# Patient Record
Sex: FEMALE | Race: WHITE | NOT HISPANIC OR LATINO | Employment: OTHER | URBAN - METROPOLITAN AREA
[De-identification: names, ages, dates, MRNs, and addresses within clinical notes are randomized per-mention and may not be internally consistent; named-entity substitution may affect disease eponyms.]

---

## 2022-04-07 ENCOUNTER — HISTORICAL (OUTPATIENT)
Dept: ADMINISTRATIVE | Facility: HOSPITAL | Age: 72
End: 2022-04-07

## 2022-04-23 VITALS
HEIGHT: 62 IN | WEIGHT: 130.06 LBS | BODY MASS INDEX: 23.93 KG/M2 | SYSTOLIC BLOOD PRESSURE: 174 MMHG | DIASTOLIC BLOOD PRESSURE: 71 MMHG

## 2023-09-25 RX ORDER — PIOGLITAZONEHYDROCHLORIDE 15 MG/1
1 TABLET ORAL DAILY
COMMUNITY

## 2023-09-25 RX ORDER — ISOSORBIDE MONONITRATE 30 MG/1
1 TABLET, EXTENDED RELEASE ORAL DAILY
Status: ON HOLD | COMMUNITY
End: 2023-10-19 | Stop reason: HOSPADM

## 2023-09-25 NOTE — PROGRESS NOTES
"    [facility] - Clinic Note  Wilmer Zhong MD      Patient Name: Sheridan Fong                   : 1950      MRN: [id number]   Visit Date: 2023       History Present Illness     Reason for Visit: Carotid Artery Disease    Ms. Fong presents to the clinic for evaluation of severe carotid disease. CTA neck done 23. She denies signs or symptoms of stroke. She states her previous primary care provider told her years ago there was evidence of a small stroke on imaging. She has been under carotid surveillance with her cardiologist via ultrasound testing. She denies history of neck surgery or radiation. She reports heart surgery in  with stenting x 8 after that time. Most recent was in 2016. She states she could easily walk 0.25 mile on most days. Primary limitation is shortness of breath and/or dizziness. She is a former smoker.       REVIEW OF SYSTEMS:  ROS  12 point review of systems conducted, negative except as stated in the history of present illness. See HPI for details.        Physical Exam      Visit Vitals  BP (!) 178/81 (BP Location: Left arm)   Pulse 71   Ht 5' 3" (1.6 m)   Wt 64.4 kg (142 lb)   BMI 25.15 kg/m²         General: well-nourished, no acute distress, and healthy appearing, ambulating normally, alert, pleasant, conversant, and oriented  Neurologic: cranial nerves are grossly intact marginal mandibular of the facial nerve and hypoglossal nerve demonstrates appropriate function, no neurologic deficits  HEENT: grossly normal and no scleral icterus  Neck/Chest: normal , soft without lymphadenopathy, and palpable carotid pulses bilaterally, no visible neck scars  Respiratory: breathing easily and without respiratory distress  Abdomen: normal and soft  Cardiology: regular rate and rhythm    Upper Extremity Arterial Exam:   Right - radial is palpable  Left - radial is palpable    Lower Extremity Arterial Exam:  Right - posterior tibial is palpable and dorsalis pedis " is palpable  Left - posterior tibial is palpable and dorsalis pedis is palpable    Musculoskeletal:   Upper Extremity: normal bilateral hand function, hands are warm bilaterally  Lower Extremity: no edema present to bilateral lower extremities              Assessment and Plan     Ms. Fong is a 72 y.o. woman with asymptomatic carotid artery stenosis. I personally reviewed the images from her CTA neck obtained which demonstrates RIGHT carotid with severe focal stenosis (consistent with ultrasound velocities of 244 cm/s) and LEFT carotid demonstrates moderate stenosis. I recommend proceeding with a RIGHT CAROTID ENDARTERECTOMY for stroke risk reduction. We discussed the risks and benefits of surgery including but not limited to bleeding, stroke, cranial nerve injury, myocardial infarction, and/or infection. We also discussed the increased of stroke without surgical intervention. She wishes to proceed.    Advised initiation of low-dose aspirin around the time of surgery while off Plavix.       1. Bilateral carotid artery stenosis  - Basic Metabolic Panel; Future  - CBC Auto Differential; Future  - EKG 12-lead; Future  - X-Ray Chest PA And Lateral Pre-OP; Future           Imaging Obtained/Reviewed   Study: carotid duplex and CTA neck  Date:   8/20/23, 9/27/23        Medical History     Past Medical History:   Diagnosis Date    Anemia, unspecified     Arthritis     CAD (coronary artery disease)     Cardiac arrhythmia     Diverticulitis     Heart disease     HLD (hyperlipidemia)     HTN (hypertension)     Hypomagnesemia     Left ventricular dysfunction     LVH (left ventricular hypertrophy)     Nonrheumatic mitral (valve) insufficiency     Occlusion and stenosis of bilateral carotid arteries     Type 2 diabetes mellitus without complications      Past Surgical History:   Procedure Laterality Date    BIOPSY  02/14/2019    Vulvar    COLONOSCOPY      CORONARY ANGIOGRAPHY INCLUDING BYPASS GRAFTS WITH CATHETERIZATION OF LEFT  HEART Left 2014    CORONARY ARTERY BYPASS GRAFT  10/01/2000    x4    OOPHORECTOMY      ROBOT-ASSISTED SURGICAL REMOVAL OF FALLOPIAN TUBE USING DA DOUG XI      TUBAL LIGATION       Family History   Problem Relation Age of Onset    Hyperlipidemia Mother     Heart attack Father     Hypertension Sister     Hypertension Brother     Cancer Brother         Tumor of colon     Social History     Socioeconomic History    Marital status: Unknown   Tobacco Use    Smoking status: Former     Current packs/day: 0.00     Types: Cigarettes     Quit date:      Years since quittin.7    Smokeless tobacco: Never     Current Outpatient Medications   Medication Instructions    albuterol (PROVENTIL/VENTOLIN HFA) 90 mcg/actuation inhaler 1 puff as needed Inhalation every 4 hrs for 30 days    clopidogrel bisulfate (PLAVIX ORAL) No dose, route, or frequency recorded.    cyclobenzaprine (FLEXERIL) 10 MG tablet 1 tablet Orally every 8 hrs as needed for 5 days    fenofibrate (TRICOR) 145 MG tablet 1 tablet with food Orally Once a day for 90 days    isosorbide dinitrate (ISORDIL) 30 MG Tab 1 tablet Orally Twice a day for 90 days    isosorbide mononitrate (IMDUR) 30 MG 24 hr tablet 1 tablet, Oral, Daily    nitroGLYCERIN (NITROSTAT) 0.4 MG SL tablet Sublingual    pioglitazone (ACTOS) 15 MG tablet 1 tablet, Oral, Daily    simvastatin (ZOCOR) 20 MG tablet TAKE ONE TABLET BY MOUTH EVERY DAY Orally Once a day for 90 days    vitamin E 100 UNIT capsule APPLY A THIN LAYER TO THE AFFECTED AREA AS NEEDED     Review of patient's allergies indicates:   Allergen Reactions    Gemfibrozil Other (See Comments)    Iodine     Nitroglycerin Other (See Comments)       Patient Care Team:  Jayde Lao MD as PCP - General (Internal Medicine)  Markos Ortiz MD as Consulting Physician (Cardiology)        No follow-ups on file. In addition to their scheduled follow up, the patient has also been instructed to follow up on as needed basis.     No  future appointments.

## 2023-09-25 NOTE — H&P (VIEW-ONLY)
"    [facility] Vascular - Clinic Note  Wilemr Zhong MD      Patient Name: Sheridan Fong                   : 1950      MRN: [id number]   Visit Date: 2023       History Present Illness     Reason for Visit: Carotid Artery Disease    Ms. Fong presents to the clinic for evaluation of severe carotid disease. CTA neck done 23. She denies signs or symptoms of stroke. She states her previous primary care provider told her years ago there was evidence of a small stroke on imaging. She has been under carotid surveillance with her cardiologist via ultrasound testing. She denies history of neck surgery or radiation. She reports heart surgery in  with stenting x 8 after that time. Most recent was in 2016. She states she could easily walk 0.25 mile on most days. Primary limitation is shortness of breath and/or dizziness. She is a former smoker.       REVIEW OF SYSTEMS:  ROS  12 point review of systems conducted, negative except as stated in the history of present illness. See HPI for details.        Physical Exam      Visit Vitals  BP (!) 178/81 (BP Location: Left arm)   Pulse 71   Ht 5' 3" (1.6 m)   Wt 64.4 kg (142 lb)   BMI 25.15 kg/m²         General: well-nourished, no acute distress, and healthy appearing, ambulating normally, alert, pleasant, conversant, and oriented  Neurologic: cranial nerves are grossly intact marginal mandibular of the facial nerve and hypoglossal nerve demonstrates appropriate function, no neurologic deficits  HEENT: grossly normal and no scleral icterus  Neck/Chest: normal , soft without lymphadenopathy, and palpable carotid pulses bilaterally, no visible neck scars  Respiratory: breathing easily and without respiratory distress  Abdomen: normal and soft  Cardiology: regular rate and rhythm    Upper Extremity Arterial Exam:   Right - radial is palpable  Left - radial is palpable    Lower Extremity Arterial Exam:  Right - posterior tibial is palpable and dorsalis pedis " is palpable  Left - posterior tibial is palpable and dorsalis pedis is palpable    Musculoskeletal:   Upper Extremity: normal bilateral hand function, hands are warm bilaterally  Lower Extremity: no edema present to bilateral lower extremities              Assessment and Plan     Ms. Fong is a 72 y.o. woman with asymptomatic carotid artery stenosis. I personally reviewed the images from her CTA neck obtained which demonstrates RIGHT carotid with severe focal stenosis (consistent with ultrasound velocities of 244 cm/s) and LEFT carotid demonstrates moderate stenosis. I recommend proceeding with a RIGHT CAROTID ENDARTERECTOMY for stroke risk reduction. We discussed the risks and benefits of surgery including but not limited to bleeding, stroke, cranial nerve injury, myocardial infarction, and/or infection. We also discussed the increased of stroke without surgical intervention. She wishes to proceed.    Advised initiation of low-dose aspirin around the time of surgery while off Plavix.       1. Bilateral carotid artery stenosis  - Basic Metabolic Panel; Future  - CBC Auto Differential; Future  - EKG 12-lead; Future  - X-Ray Chest PA And Lateral Pre-OP; Future           Imaging Obtained/Reviewed   Study: carotid duplex and CTA neck  Date:   8/20/23, 9/27/23        Medical History     Past Medical History:   Diagnosis Date    Anemia, unspecified     Arthritis     CAD (coronary artery disease)     Cardiac arrhythmia     Diverticulitis     Heart disease     HLD (hyperlipidemia)     HTN (hypertension)     Hypomagnesemia     Left ventricular dysfunction     LVH (left ventricular hypertrophy)     Nonrheumatic mitral (valve) insufficiency     Occlusion and stenosis of bilateral carotid arteries     Type 2 diabetes mellitus without complications      Past Surgical History:   Procedure Laterality Date    BIOPSY  02/14/2019    Vulvar    COLONOSCOPY      CORONARY ANGIOGRAPHY INCLUDING BYPASS GRAFTS WITH CATHETERIZATION OF LEFT  HEART Left 2014    CORONARY ARTERY BYPASS GRAFT  10/01/2000    x4    OOPHORECTOMY      ROBOT-ASSISTED SURGICAL REMOVAL OF FALLOPIAN TUBE USING DA DOUG XI      TUBAL LIGATION       Family History   Problem Relation Age of Onset    Hyperlipidemia Mother     Heart attack Father     Hypertension Sister     Hypertension Brother     Cancer Brother         Tumor of colon     Social History     Socioeconomic History    Marital status: Unknown   Tobacco Use    Smoking status: Former     Current packs/day: 0.00     Types: Cigarettes     Quit date:      Years since quittin.7    Smokeless tobacco: Never     Current Outpatient Medications   Medication Instructions    albuterol (PROVENTIL/VENTOLIN HFA) 90 mcg/actuation inhaler 1 puff as needed Inhalation every 4 hrs for 30 days    clopidogrel bisulfate (PLAVIX ORAL) No dose, route, or frequency recorded.    cyclobenzaprine (FLEXERIL) 10 MG tablet 1 tablet Orally every 8 hrs as needed for 5 days    fenofibrate (TRICOR) 145 MG tablet 1 tablet with food Orally Once a day for 90 days    isosorbide dinitrate (ISORDIL) 30 MG Tab 1 tablet Orally Twice a day for 90 days    isosorbide mononitrate (IMDUR) 30 MG 24 hr tablet 1 tablet, Oral, Daily    nitroGLYCERIN (NITROSTAT) 0.4 MG SL tablet Sublingual    pioglitazone (ACTOS) 15 MG tablet 1 tablet, Oral, Daily    simvastatin (ZOCOR) 20 MG tablet TAKE ONE TABLET BY MOUTH EVERY DAY Orally Once a day for 90 days    vitamin E 100 UNIT capsule APPLY A THIN LAYER TO THE AFFECTED AREA AS NEEDED     Review of patient's allergies indicates:   Allergen Reactions    Gemfibrozil Other (See Comments)    Iodine     Nitroglycerin Other (See Comments)       Patient Care Team:  Jayde Lao MD as PCP - General (Internal Medicine)  Markos Ortiz MD as Consulting Physician (Cardiology)        No follow-ups on file. In addition to their scheduled follow up, the patient has also been instructed to follow up on as needed basis.     No  future appointments.

## 2023-09-27 DIAGNOSIS — I65.23 BILATERAL CAROTID ARTERY STENOSIS: Primary | ICD-10-CM

## 2023-09-27 RX ORDER — NITROGLYCERIN 0.4 MG/1
TABLET SUBLINGUAL
COMMUNITY

## 2023-09-27 RX ORDER — ISOSORBIDE DINITRATE 30 MG/1
40 TABLET ORAL 3 TIMES DAILY
Status: ON HOLD | COMMUNITY
End: 2023-10-19 | Stop reason: CLARIF

## 2023-09-27 RX ORDER — FENOFIBRATE 145 MG/1
TABLET, FILM COATED ORAL
COMMUNITY

## 2023-09-27 RX ORDER — SIMVASTATIN 20 MG/1
TABLET, FILM COATED ORAL
COMMUNITY

## 2023-09-27 RX ORDER — CYCLOBENZAPRINE HCL 10 MG
TABLET ORAL
Status: ON HOLD | COMMUNITY
Start: 2023-01-16 | End: 2023-10-19 | Stop reason: HOSPADM

## 2023-09-27 RX ORDER — ALBUTEROL SULFATE 90 UG/1
AEROSOL, METERED RESPIRATORY (INHALATION)
COMMUNITY
Start: 2023-01-16

## 2023-09-28 ENCOUNTER — OFFICE VISIT (OUTPATIENT)
Dept: VASCULAR SURGERY | Facility: CLINIC | Age: 73
End: 2023-09-28
Payer: MEDICARE

## 2023-09-28 VITALS
SYSTOLIC BLOOD PRESSURE: 178 MMHG | DIASTOLIC BLOOD PRESSURE: 81 MMHG | HEIGHT: 63 IN | BODY MASS INDEX: 25.16 KG/M2 | HEART RATE: 71 BPM | WEIGHT: 142 LBS

## 2023-09-28 DIAGNOSIS — I65.23 BILATERAL CAROTID ARTERY STENOSIS: Primary | ICD-10-CM

## 2023-09-28 PROCEDURE — 99204 PR OFFICE/OUTPT VISIT, NEW, LEVL IV, 45-59 MIN: ICD-10-PCS | Mod: ,,, | Performed by: SPECIALIST

## 2023-09-28 PROCEDURE — 99204 OFFICE O/P NEW MOD 45 MIN: CPT | Mod: ,,, | Performed by: SPECIALIST

## 2023-09-28 RX ORDER — HYDROCODONE BITARTRATE AND ACETAMINOPHEN 7.5; 325 MG/1; MG/1
1 TABLET ORAL EVERY 6 HOURS PRN
Qty: 28 TABLET | Refills: 0 | Status: ON HOLD | OUTPATIENT
Start: 2023-09-28 | End: 2023-10-19 | Stop reason: SDUPTHER

## 2023-09-28 RX ORDER — SODIUM CHLORIDE 9 MG/ML
INJECTION, SOLUTION INTRAVENOUS CONTINUOUS
Status: CANCELLED | OUTPATIENT
Start: 2023-09-28

## 2023-09-28 NOTE — LETTER
[facility]           Medication Clearance Form    PLEASE PROVIDE ALL INFORMATION      Date : 2023       Please provide us with WRITTEN Medication Clearance:  Ms. Sheridan Fong    : 1950       Please send most recent clinic, EKG, echocardiogram, and/or additional cardiac testing.      She will be scheduled for a Right Carotid Endarterectomy under General scheduled on  10/18/2023.      PLEASE PROVIDE CLEARANCE TO HOLD Plavix (clopidogrel) X 5 DAYS.       Thank you for your help in this matter.    Sincerely,        Wilmer Zhong MD  [facility]  Phone: [phone]  Fax: [phone]

## 2023-10-09 RX ORDER — CLOPIDOGREL BISULFATE 75 MG/1
75 TABLET ORAL DAILY
COMMUNITY

## 2023-10-11 ENCOUNTER — ANESTHESIA EVENT (OUTPATIENT)
Dept: SURGERY | Facility: HOSPITAL | Age: 73
DRG: 039 | End: 2023-10-11
Payer: MEDICARE

## 2023-10-11 ENCOUNTER — HOSPITAL ENCOUNTER (OUTPATIENT)
Dept: RADIOLOGY | Facility: HOSPITAL | Age: 73
Discharge: HOME | End: 2023-10-11
Attending: SPECIALIST
Payer: MEDICARE

## 2023-10-11 DIAGNOSIS — I65.23 BILATERAL CAROTID ARTERY STENOSIS: ICD-10-CM

## 2023-10-11 PROCEDURE — 71046 X-RAY EXAM CHEST 2 VIEWS: CPT | Mod: TC

## 2023-10-18 ENCOUNTER — HOSPITAL ENCOUNTER (INPATIENT)
Facility: HOSPITAL | Age: 73
LOS: 1 days | Discharge: HOME | DRG: 039 | End: 2023-10-19
Attending: SPECIALIST | Admitting: SPECIALIST
Payer: MEDICARE

## 2023-10-18 ENCOUNTER — ANESTHESIA (OUTPATIENT)
Dept: SURGERY | Facility: HOSPITAL | Age: 73
DRG: 039 | End: 2023-10-18
Payer: MEDICARE

## 2023-10-18 DIAGNOSIS — I65.23 BILATERAL CAROTID ARTERY STENOSIS: ICD-10-CM

## 2023-10-18 LAB
ABORH RETYPE: NORMAL
GROUP & RH: NORMAL
INDIRECT COOMBS GEL: NORMAL
POCT GLUCOSE: 102 MG/DL (ref 70–110)
POCT GLUCOSE: 132 MG/DL (ref 70–110)
POCT GLUCOSE: 142 MG/DL (ref 70–110)
SPECIMEN OUTDATE: NORMAL

## 2023-10-18 PROCEDURE — 27201423 OPTIME MED/SURG SUP & DEVICES STERILE SUPPLY: Performed by: SPECIALIST

## 2023-10-18 PROCEDURE — 35301 PR THROMBOENDARTECTMY NECK,NECK INCIS: ICD-10-PCS | Mod: RT,,, | Performed by: SPECIALIST

## 2023-10-18 PROCEDURE — 11000001 HC ACUTE MED/SURG PRIVATE ROOM

## 2023-10-18 PROCEDURE — 37000008 HC ANESTHESIA 1ST 15 MINUTES: Performed by: SPECIALIST

## 2023-10-18 PROCEDURE — 63600175 PHARM REV CODE 636 W HCPCS

## 2023-10-18 PROCEDURE — 71000039 HC RECOVERY, EACH ADD'L HOUR: Performed by: SPECIALIST

## 2023-10-18 PROCEDURE — 86901 BLOOD TYPING SEROLOGIC RH(D): CPT | Performed by: ANESTHESIOLOGY

## 2023-10-18 PROCEDURE — 25000003 PHARM REV CODE 250: Performed by: SPECIALIST

## 2023-10-18 PROCEDURE — 35301 RECHANNELING OF ARTERY: CPT | Mod: RT,,, | Performed by: SPECIALIST

## 2023-10-18 PROCEDURE — 25000003 PHARM REV CODE 250

## 2023-10-18 PROCEDURE — 71000033 HC RECOVERY, INTIAL HOUR: Performed by: SPECIALIST

## 2023-10-18 PROCEDURE — C1768 GRAFT, VASCULAR: HCPCS | Performed by: SPECIALIST

## 2023-10-18 PROCEDURE — 36620 INSERTION CATHETER ARTERY: CPT

## 2023-10-18 PROCEDURE — D9220A PRA ANESTHESIA: Mod: ANES,,, | Performed by: ANESTHESIOLOGY

## 2023-10-18 PROCEDURE — D9220A PRA ANESTHESIA: ICD-10-PCS | Mod: ANES,,, | Performed by: ANESTHESIOLOGY

## 2023-10-18 PROCEDURE — 71000015 HC POSTOP RECOV 1ST HR: Performed by: SPECIALIST

## 2023-10-18 PROCEDURE — 63600175 PHARM REV CODE 636 W HCPCS: Performed by: ANESTHESIOLOGY

## 2023-10-18 PROCEDURE — 37000009 HC ANESTHESIA EA ADD 15 MINS: Performed by: SPECIALIST

## 2023-10-18 PROCEDURE — D9220A PRA ANESTHESIA: ICD-10-PCS | Mod: CRNA,,, | Performed by: NURSE ANESTHETIST, CERTIFIED REGISTERED

## 2023-10-18 PROCEDURE — 63600175 PHARM REV CODE 636 W HCPCS: Performed by: SPECIALIST

## 2023-10-18 PROCEDURE — 27000221 HC OXYGEN, UP TO 24 HOURS

## 2023-10-18 PROCEDURE — 36620 INSERTION CATHETER ARTERY: CPT | Mod: 59,,, | Performed by: ANESTHESIOLOGY

## 2023-10-18 PROCEDURE — 36000707: Performed by: SPECIALIST

## 2023-10-18 PROCEDURE — 88311 DECALCIFY TISSUE: CPT

## 2023-10-18 PROCEDURE — 88304 TISSUE EXAM BY PATHOLOGIST: CPT | Performed by: SPECIALIST

## 2023-10-18 PROCEDURE — 36000706: Performed by: SPECIALIST

## 2023-10-18 PROCEDURE — 82962 GLUCOSE BLOOD TEST: CPT | Performed by: SPECIALIST

## 2023-10-18 PROCEDURE — 36620 ARTERIAL: ICD-10-PCS | Mod: 59,,, | Performed by: ANESTHESIOLOGY

## 2023-10-18 PROCEDURE — D9220A PRA ANESTHESIA: Mod: CRNA,,, | Performed by: NURSE ANESTHETIST, CERTIFIED REGISTERED

## 2023-10-18 DEVICE — PATCH XENOSURE TAPR .8X8CM: Type: IMPLANTABLE DEVICE | Site: CAROTID | Status: FUNCTIONAL

## 2023-10-18 RX ORDER — HYDROCODONE BITARTRATE AND ACETAMINOPHEN 7.5; 325 MG/1; MG/1
1 TABLET ORAL EVERY 6 HOURS PRN
Status: DISCONTINUED | OUTPATIENT
Start: 2023-10-18 | End: 2023-10-19 | Stop reason: HOSPADM

## 2023-10-18 RX ORDER — IBUPROFEN 200 MG
16 TABLET ORAL
Status: DISCONTINUED | OUTPATIENT
Start: 2023-10-18 | End: 2023-10-19 | Stop reason: HOSPADM

## 2023-10-18 RX ORDER — HEPARIN SODIUM 5000 [USP'U]/ML
INJECTION, SOLUTION INTRAVENOUS; SUBCUTANEOUS
Status: DISCONTINUED | OUTPATIENT
Start: 2023-10-18 | End: 2023-10-18 | Stop reason: HOSPADM

## 2023-10-18 RX ORDER — ACETAMINOPHEN 10 MG/ML
INJECTION, SOLUTION INTRAVENOUS
Status: DISCONTINUED | OUTPATIENT
Start: 2023-10-18 | End: 2023-10-18

## 2023-10-18 RX ORDER — SODIUM CHLORIDE, SODIUM LACTATE, POTASSIUM CHLORIDE, CALCIUM CHLORIDE 600; 310; 30; 20 MG/100ML; MG/100ML; MG/100ML; MG/100ML
INJECTION, SOLUTION INTRAVENOUS CONTINUOUS
Status: CANCELLED | OUTPATIENT
Start: 2023-10-18

## 2023-10-18 RX ORDER — ONDANSETRON 4 MG/1
4 TABLET, ORALLY DISINTEGRATING ORAL EVERY 6 HOURS PRN
Status: CANCELLED | OUTPATIENT
Start: 2023-10-18

## 2023-10-18 RX ORDER — NITROGLYCERIN 0.4 MG/1
0.4 TABLET SUBLINGUAL EVERY 5 MIN PRN
Status: DISCONTINUED | OUTPATIENT
Start: 2023-10-18 | End: 2023-10-19 | Stop reason: HOSPADM

## 2023-10-18 RX ORDER — HYDRALAZINE HYDROCHLORIDE 20 MG/ML
10 INJECTION INTRAMUSCULAR; INTRAVENOUS
Status: DISCONTINUED | OUTPATIENT
Start: 2023-10-18 | End: 2023-10-19 | Stop reason: HOSPADM

## 2023-10-18 RX ORDER — ASPIRIN 81 MG/1
81 TABLET ORAL DAILY
COMMUNITY

## 2023-10-18 RX ORDER — CEFAZOLIN SODIUM 2 G/50ML
2 SOLUTION INTRAVENOUS
Status: COMPLETED | OUTPATIENT
Start: 2023-10-18 | End: 2023-10-18

## 2023-10-18 RX ORDER — ONDANSETRON 2 MG/ML
4 INJECTION INTRAMUSCULAR; INTRAVENOUS EVERY 12 HOURS PRN
Status: DISCONTINUED | OUTPATIENT
Start: 2023-10-18 | End: 2023-10-19 | Stop reason: HOSPADM

## 2023-10-18 RX ORDER — MUPIROCIN 20 MG/G
OINTMENT TOPICAL 2 TIMES DAILY
Status: DISCONTINUED | OUTPATIENT
Start: 2023-10-18 | End: 2023-10-19 | Stop reason: HOSPADM

## 2023-10-18 RX ORDER — LIDOCAINE HYDROCHLORIDE 20 MG/ML
INJECTION INTRAVENOUS
Status: DISCONTINUED | OUTPATIENT
Start: 2023-10-18 | End: 2023-10-18

## 2023-10-18 RX ORDER — HEPARIN SODIUM 1000 [USP'U]/ML
INJECTION, SOLUTION INTRAVENOUS; SUBCUTANEOUS
Status: DISCONTINUED | OUTPATIENT
Start: 2023-10-18 | End: 2023-10-18

## 2023-10-18 RX ORDER — INSULIN ASPART 100 [IU]/ML
0-10 INJECTION, SOLUTION INTRAVENOUS; SUBCUTANEOUS
Status: DISCONTINUED | OUTPATIENT
Start: 2023-10-18 | End: 2023-10-19 | Stop reason: HOSPADM

## 2023-10-18 RX ORDER — HYDROMORPHONE HYDROCHLORIDE 2 MG/ML
0.5 INJECTION, SOLUTION INTRAMUSCULAR; INTRAVENOUS; SUBCUTANEOUS EVERY 4 HOURS PRN
Status: DISCONTINUED | OUTPATIENT
Start: 2023-10-18 | End: 2023-10-19 | Stop reason: HOSPADM

## 2023-10-18 RX ORDER — ALBUTEROL SULFATE 90 UG/1
1 AEROSOL, METERED RESPIRATORY (INHALATION) EVERY 4 HOURS PRN
Status: DISCONTINUED | OUTPATIENT
Start: 2023-10-18 | End: 2023-10-19 | Stop reason: HOSPADM

## 2023-10-18 RX ORDER — ONDANSETRON 2 MG/ML
INJECTION INTRAMUSCULAR; INTRAVENOUS
Status: DISCONTINUED | OUTPATIENT
Start: 2023-10-18 | End: 2023-10-18

## 2023-10-18 RX ORDER — DEXMEDETOMIDINE HYDROCHLORIDE 100 UG/ML
INJECTION, SOLUTION INTRAVENOUS
Status: DISCONTINUED | OUTPATIENT
Start: 2023-10-18 | End: 2023-10-18

## 2023-10-18 RX ORDER — SODIUM CITRATE AND CITRIC ACID MONOHYDRATE 334; 500 MG/5ML; MG/5ML
30 SOLUTION ORAL
Status: CANCELLED | OUTPATIENT
Start: 2023-10-18

## 2023-10-18 RX ORDER — GLUCAGON 1 MG
1 KIT INJECTION
Status: DISCONTINUED | OUTPATIENT
Start: 2023-10-18 | End: 2023-10-19 | Stop reason: HOSPADM

## 2023-10-18 RX ORDER — LIDOCAINE HYDROCHLORIDE 10 MG/ML
1 INJECTION, SOLUTION EPIDURAL; INFILTRATION; INTRACAUDAL; PERINEURAL ONCE
Status: CANCELLED | OUTPATIENT
Start: 2023-10-18 | End: 2023-10-18

## 2023-10-18 RX ORDER — ATORVASTATIN CALCIUM 10 MG/1
20 TABLET, FILM COATED ORAL DAILY
Status: DISCONTINUED | OUTPATIENT
Start: 2023-10-18 | End: 2023-10-19 | Stop reason: HOSPADM

## 2023-10-18 RX ORDER — MIDAZOLAM HYDROCHLORIDE 1 MG/ML
2 INJECTION INTRAMUSCULAR; INTRAVENOUS ONCE AS NEEDED
Status: CANCELLED | OUTPATIENT
Start: 2023-10-18 | End: 2035-03-15

## 2023-10-18 RX ORDER — IBUPROFEN 200 MG
24 TABLET ORAL
Status: DISCONTINUED | OUTPATIENT
Start: 2023-10-18 | End: 2023-10-19 | Stop reason: HOSPADM

## 2023-10-18 RX ORDER — CEFAZOLIN SODIUM 1 G/3ML
INJECTION, POWDER, FOR SOLUTION INTRAMUSCULAR; INTRAVENOUS
Status: DISCONTINUED | OUTPATIENT
Start: 2023-10-18 | End: 2023-10-18 | Stop reason: HOSPADM

## 2023-10-18 RX ORDER — ROCURONIUM BROMIDE 10 MG/ML
INJECTION, SOLUTION INTRAVENOUS
Status: DISCONTINUED | OUTPATIENT
Start: 2023-10-18 | End: 2023-10-18

## 2023-10-18 RX ORDER — PROTAMINE SULFATE 10 MG/ML
INJECTION, SOLUTION INTRAVENOUS
Status: DISCONTINUED | OUTPATIENT
Start: 2023-10-18 | End: 2023-10-18

## 2023-10-18 RX ORDER — ACETAMINOPHEN 10 MG/ML
1000 INJECTION, SOLUTION INTRAVENOUS ONCE
Status: DISCONTINUED | OUTPATIENT
Start: 2023-10-18 | End: 2023-10-18 | Stop reason: HOSPADM

## 2023-10-18 RX ORDER — ISOSORBIDE DINITRATE 20 MG/1
40 TABLET ORAL 3 TIMES DAILY
Status: DISCONTINUED | OUTPATIENT
Start: 2023-10-18 | End: 2023-10-19 | Stop reason: HOSPADM

## 2023-10-18 RX ORDER — SODIUM CHLORIDE 9 MG/ML
INJECTION, SOLUTION INTRAVENOUS CONTINUOUS
Status: DISCONTINUED | OUTPATIENT
Start: 2023-10-18 | End: 2023-10-18

## 2023-10-18 RX ORDER — FENTANYL CITRATE 50 UG/ML
INJECTION, SOLUTION INTRAMUSCULAR; INTRAVENOUS
Status: DISCONTINUED | OUTPATIENT
Start: 2023-10-18 | End: 2023-10-18

## 2023-10-18 RX ORDER — GLYCOPYRROLATE 0.2 MG/ML
INJECTION INTRAMUSCULAR; INTRAVENOUS
Status: DISCONTINUED | OUTPATIENT
Start: 2023-10-18 | End: 2023-10-18

## 2023-10-18 RX ORDER — MEPERIDINE HYDROCHLORIDE 25 MG/ML
12.5 INJECTION INTRAMUSCULAR; INTRAVENOUS; SUBCUTANEOUS ONCE AS NEEDED
Status: DISCONTINUED | OUTPATIENT
Start: 2023-10-18 | End: 2023-10-18 | Stop reason: HOSPADM

## 2023-10-18 RX ORDER — HYDRALAZINE HYDROCHLORIDE 20 MG/ML
5 INJECTION INTRAMUSCULAR; INTRAVENOUS
Status: DISCONTINUED | OUTPATIENT
Start: 2023-10-18 | End: 2023-10-18 | Stop reason: HOSPADM

## 2023-10-18 RX ORDER — HYDROMORPHONE HYDROCHLORIDE 2 MG/ML
0.2 INJECTION, SOLUTION INTRAMUSCULAR; INTRAVENOUS; SUBCUTANEOUS EVERY 5 MIN PRN
Status: DISCONTINUED | OUTPATIENT
Start: 2023-10-18 | End: 2023-10-18 | Stop reason: HOSPADM

## 2023-10-18 RX ORDER — ASPIRIN 325 MG
325 TABLET, DELAYED RELEASE (ENTERIC COATED) ORAL DAILY
Status: DISCONTINUED | OUTPATIENT
Start: 2023-10-18 | End: 2023-10-19 | Stop reason: HOSPADM

## 2023-10-18 RX ORDER — DEXAMETHASONE SODIUM PHOSPHATE 4 MG/ML
INJECTION, SOLUTION INTRA-ARTICULAR; INTRALESIONAL; INTRAMUSCULAR; INTRAVENOUS; SOFT TISSUE
Status: DISCONTINUED | OUTPATIENT
Start: 2023-10-18 | End: 2023-10-18

## 2023-10-18 RX ORDER — PHENYLEPHRINE HCL IN 0.9% NACL 1 MG/10 ML
SYRINGE (ML) INTRAVENOUS
Status: DISCONTINUED | OUTPATIENT
Start: 2023-10-18 | End: 2023-10-18

## 2023-10-18 RX ORDER — PROPOFOL 10 MG/ML
VIAL (ML) INTRAVENOUS
Status: DISCONTINUED | OUTPATIENT
Start: 2023-10-18 | End: 2023-10-18

## 2023-10-18 RX ADMIN — PROPOFOL 150 MG: 10 INJECTION, EMULSION INTRAVENOUS at 07:10

## 2023-10-18 RX ADMIN — HYDROMORPHONE HYDROCHLORIDE 0.2 MG: 2 INJECTION INTRAMUSCULAR; INTRAVENOUS; SUBCUTANEOUS at 11:10

## 2023-10-18 RX ADMIN — MUPIROCIN: 20 OINTMENT TOPICAL at 09:10

## 2023-10-18 RX ADMIN — FENTANYL CITRATE 100 MCG: 50 INJECTION, SOLUTION INTRAMUSCULAR; INTRAVENOUS at 07:10

## 2023-10-18 RX ADMIN — HYDROCODONE BITARTRATE AND ACETAMINOPHEN 1 TABLET: 7.5; 325 TABLET ORAL at 10:10

## 2023-10-18 RX ADMIN — PROTAMINE SULFATE 10 MG: 10 INJECTION, SOLUTION INTRAVENOUS at 09:10

## 2023-10-18 RX ADMIN — SODIUM CHLORIDE, SODIUM GLUCONATE, SODIUM ACETATE, POTASSIUM CHLORIDE AND MAGNESIUM CHLORIDE: 526; 502; 368; 37; 30 INJECTION, SOLUTION INTRAVENOUS at 07:10

## 2023-10-18 RX ADMIN — HEPARIN SODIUM 7000 UNITS: 1000 INJECTION, SOLUTION INTRAVENOUS; SUBCUTANEOUS at 08:10

## 2023-10-18 RX ADMIN — ISOSORBIDE DINITRATE 40 MG: 20 TABLET ORAL at 05:10

## 2023-10-18 RX ADMIN — ONDANSETRON 4 MG: 2 INJECTION INTRAMUSCULAR; INTRAVENOUS at 03:10

## 2023-10-18 RX ADMIN — ONDANSETRON 4 MG: 2 INJECTION INTRAMUSCULAR; INTRAVENOUS at 06:10

## 2023-10-18 RX ADMIN — ONDANSETRON 4 MG: 2 INJECTION INTRAMUSCULAR; INTRAVENOUS at 11:10

## 2023-10-18 RX ADMIN — ISOSORBIDE DINITRATE 40 MG: 20 TABLET ORAL at 09:10

## 2023-10-18 RX ADMIN — ATORVASTATIN CALCIUM 20 MG: 10 TABLET, FILM COATED ORAL at 05:10

## 2023-10-18 RX ADMIN — HYDRALAZINE HYDROCHLORIDE 5 MG: 20 INJECTION INTRAMUSCULAR; INTRAVENOUS at 10:10

## 2023-10-18 RX ADMIN — SUGAMMADEX 130 MG: 100 INJECTION, SOLUTION INTRAVENOUS at 09:10

## 2023-10-18 RX ADMIN — HYDROMORPHONE HYDROCHLORIDE 0.5 MG: 2 INJECTION INTRAMUSCULAR; INTRAVENOUS; SUBCUTANEOUS at 04:10

## 2023-10-18 RX ADMIN — HEPARIN SODIUM 2000 UNITS: 1000 INJECTION, SOLUTION INTRAVENOUS; SUBCUTANEOUS at 08:10

## 2023-10-18 RX ADMIN — ACETAMINOPHEN 1000 MG: 10 INJECTION, SOLUTION INTRAVENOUS at 09:10

## 2023-10-18 RX ADMIN — ROCURONIUM BROMIDE 40 MG: 10 SOLUTION INTRAVENOUS at 07:10

## 2023-10-18 RX ADMIN — Medication 100 MCG: at 07:10

## 2023-10-18 RX ADMIN — ROCURONIUM BROMIDE 60 MG: 10 SOLUTION INTRAVENOUS at 07:10

## 2023-10-18 RX ADMIN — PROTAMINE SULFATE 35 MG: 10 INJECTION, SOLUTION INTRAVENOUS at 09:10

## 2023-10-18 RX ADMIN — PHENYLEPHRINE HYDROCHLORIDE 25 MCG/MIN: 10 INJECTION INTRAVENOUS at 08:10

## 2023-10-18 RX ADMIN — ONDANSETRON 4 MG: 2 INJECTION INTRAMUSCULAR; INTRAVENOUS at 09:10

## 2023-10-18 RX ADMIN — CEFAZOLIN SODIUM 2 G: 2 SOLUTION INTRAVENOUS at 10:10

## 2023-10-18 RX ADMIN — PROPOFOL 30 MG: 10 INJECTION, EMULSION INTRAVENOUS at 07:10

## 2023-10-18 RX ADMIN — GLYCOPYRROLATE 0.2 MG: 0.2 INJECTION INTRAMUSCULAR; INTRAVENOUS at 07:10

## 2023-10-18 RX ADMIN — DEXAMETHASONE SODIUM PHOSPHATE 4 MG: 4 INJECTION, SOLUTION INTRA-ARTICULAR; INTRALESIONAL; INTRAMUSCULAR; INTRAVENOUS; SOFT TISSUE at 07:10

## 2023-10-18 RX ADMIN — LIDOCAINE HYDROCHLORIDE 80 MG: 20 INJECTION INTRAVENOUS at 07:10

## 2023-10-18 RX ADMIN — DEXMEDETOMIDINE 6 MCG: 200 INJECTION, SOLUTION INTRAVENOUS at 06:10

## 2023-10-18 RX ADMIN — CEFAZOLIN SODIUM 2 G: 2 SOLUTION INTRAVENOUS at 03:10

## 2023-10-18 RX ADMIN — CEFAZOLIN SODIUM 2 G: 2 SOLUTION INTRAVENOUS at 07:10

## 2023-10-18 NOTE — ANESTHESIA POSTPROCEDURE EVALUATION
Anesthesia Post Evaluation    Patient: Sheridan Fong    Procedure(s) Performed: Procedure(s) (LRB):  ENDARTERECTOMY-CAROTID (Right)    Final Anesthesia Type: general (/Regional//MAC)      Patient location during evaluation: PACU  Post-procedure mental status: @ basline.  Pain management: adequate    PONV status: See postop meds for drugs used to control n/v if any.  Anesthetic complications: no      Cardiovascular status: blood pressure returned to baseline  Respiratory status: @ baseline.  Hydration status: euvolemic            Vitals Value Taken Time   /53 10/18/23 1131   Temp 36.3 °C (97.3 °F) 10/18/23 1020   Pulse 66 10/18/23 1137   Resp 13 10/18/23 1137   SpO2 96 % 10/18/23 1137   Vitals shown include unvalidated device data.      No case tracking events are documented in the log.      Pain/Cynthia Score: Cynthia Score: 8 (10/18/2023 10:00 AM)

## 2023-10-18 NOTE — INTERVAL H&P NOTE
The patient has been examined and the H&P has been reviewed:    I concur with the findings and no changes have occurred since H&P was written.  Good cranial nerve function.    Surgery risks, benefits and alternative options discussed and understood by patient/family.          There are no hospital problems to display for this patient.

## 2023-10-18 NOTE — PLAN OF CARE
Problem: Adult Inpatient Plan of Care  Goal: Plan of Care Review  Outcome: Ongoing, Progressing  Goal: Patient-Specific Goal (Individualized)  Outcome: Ongoing, Progressing  Goal: Absence of Hospital-Acquired Illness or Injury  Outcome: Ongoing, Progressing  Goal: Optimal Comfort and Wellbeing  Outcome: Ongoing, Progressing  Goal: Readiness for Transition of Care  Outcome: Ongoing, Progressing     Problem: Fall Injury Risk  Goal: Absence of Fall and Fall-Related Injury  Outcome: Ongoing, Progressing     Problem: Pain Acute  Goal: Acceptable Pain Control and Functional Ability  Outcome: Ongoing, Progressing     Problem: Bleeding (Carotid Revascularization)  Goal: Absence of Bleeding  Outcome: Ongoing, Progressing     Problem: Bowel Motility Impaired (Carotid Revascularization)  Goal: Effective Bowel Elimination  Outcome: Ongoing, Progressing     Problem: Hemodynamic Instability (Carotid Revascularization)  Goal: Adequate Tissue Perfusion  Outcome: Ongoing, Progressing     Problem: Infection (Carotid Revascularization)  Goal: Absence of Infection Signs and Symptoms  Outcome: Ongoing, Progressing     Problem: Neurologic Deterioration (Carotid Revascularization)  Goal: Absence of Acute Neurologic Symptoms  Outcome: Ongoing, Progressing     Problem: Ongoing Anesthesia Effects (Carotid Revascularization)  Goal: Anesthesia/Sedation Recovery  Outcome: Ongoing, Progressing     Problem: Pain (Carotid Revascularization)  Goal: Acceptable Pain Control  Outcome: Ongoing, Progressing     Problem: Postoperative Nausea and Vomiting (Carotid Revascularization)  Goal: Nausea and Vomiting Relief  Outcome: Ongoing, Progressing     Problem: Postoperative Urinary Retention (Carotid Revascularization)  Goal: Effective Urinary Elimination  Outcome: Ongoing, Progressing     Problem: Respiratory Compromise (Carotid Revascularization)  Goal: Effective Oxygenation and Ventilation  Outcome: Ongoing, Progressing

## 2023-10-18 NOTE — TRANSFER OF CARE
"Anesthesia Transfer of Care Note    Patient: Sheridan Fong    Procedure(s) Performed: Procedure(s) (LRB):  ENDARTERECTOMY-CAROTID (Right)    Patient location: PACU    Transport from OR: Transported from OR on room air with adequate spontaneous ventilation    Post pain: adequate analgesia    Post assessment: no apparent anesthetic complications and tolerated procedure well    Post vital signs: stable    Level of consciousness: awake, alert and oriented    Nausea/Vomiting: no nausea/vomiting    Complications: none    Transfer of care protocol was followed      Last vitals:   Visit Vitals  BP (!) 181/84 (BP Location: Right arm, Patient Position: Sitting)   Pulse 78   Temp 36.8 °C (98.3 °F) (Oral)   Resp 18   Ht 5' 3" (1.6 m)   Wt 63.7 kg (140 lb 6.9 oz)   LMP  (LMP Unknown)   SpO2 96%   Breastfeeding No   BMI 24.88 kg/m²     "

## 2023-10-18 NOTE — OP NOTE
Surgeon: Wilmer Zhong MD    Assistant: Adama Glynn MD    Date: 10/18/2023     Diagnosis: Pre-Op Diagnosis Codes:     * Bilateral carotid artery stenosis [I65.23]     Procedure:  Right carotid endarterectomy    History of Presenting Illness: Ms. Fong is a 73 y.o. year old female who has carotid stenosis and presents for right carotid endarterectomy for stroke risk reduction.      Procedure:  The patient was taken to the operating room and placed in a supine position.  General endotracheal anesthesia was initiated antibiotics were given.  The Right neck was prepped and draped in the usual sterile fashion.  Cerebral oximetry was in place.  B-mode ultrasound was utilized to evaluate the carotid system and determine the level of the carotid bifurcation.  Incision was made over the carotid system dissection was performed through the subcutaneous tissues platysma and anterior to the sternocleidomastoid.  The carotid sheath was opened and the carotid system was circumferentially controlled.  We were able to gain control of the common carotid arteries well as the external and internal carotid arteries.  Hypoglossal nerve was kept free from harm.  Superior thyroidal branch was controlled with a clip and external carotid artery was controlled with a vessel loop.  Heparinization was performed and hypertension was confirmed.  The internal carotid artery was clamped no significant cerebral oximetry decline was noted.  We then clamped the common carotid and external carotid arteries.  Longitudinal arteriotomy was made from the distal common carotid artery through the carotid bulb into the midportion of the internal carotid artery.  Endarterectomy was performed with eversion endarterectomy of the external carotid artery.  Ensured a good distal endpoint.  We then fashioned a bovine pericardial patch and performed bovine patch angioplasty with running 6 0 Prolene suture.  Prior to completing the angioplasty we did  backbleed the internal carotid artery and external carotid artery and then forward bled the common carotid artery. Heparin irrigation was performed to the lumen of the vessel.  We completed the angioplasty and allowed flow through the common carotid and external carotid arteries.  We then allowed antegrade flow into the internal carotid artery.  We ensured there was good hemostasis and performed B-mode ultrasound evaluation of the vessel ensuring good endpoints proximally and distally.  Protamine was then administered.  We again ensured good hemostasis and the sternocleidomastoid was reapproximated to the adjacent fascia with a 3-0 Vicryl suture.  The platysma was reapproximated with 3-0 Vicryl suture as well. 4-0 Monocryl was used to reapproximate the skin. Dermabond dressing was placed.  This completed our procedure.      Complications:  none    Findings: Focal high grade stenosis to the right carotid arterial system.

## 2023-10-18 NOTE — NURSING
Nurses Note -- 4 Eyes      10/18/2023   6:48 PM      Skin assessed during: Admit      [] No Altered Skin Integrity Present    []Prevention Measures Documented      [x] Yes- Altered Skin Integrity Present or Discovered   [] LDA Added if Not in Epic (Describe Wound)   [x] New Altered Skin Integrity was Present on Admit and Documented in LDA   [] Wound Image Taken    Wound Care Consulted? No    Attending Nurse:  Jose Maria Ferreira RN     Second RN/Staff Member:  VICKY Gamez RN

## 2023-10-18 NOTE — ANESTHESIA PROCEDURE NOTES
Arterial    Diagnosis: surgery    Patient location during procedure: holding area  Procedure start time: 10/18/2023 6:41 AM  Timeout: 10/18/2023 6:40 AM  Procedure end time: 10/18/2023 6:44 AM    Staffing  Authorizing Provider: Kan Mcarthur MD  Performing Provider: Kiara Soto    Staffing  Performed by: Kiara Soto  Authorized by: Kan Mcarthur MD    Anesthesiologist was present at the time of the procedure.    Preanesthetic Checklist  Completed: patient identified, IV checked, site marked, risks and benefits discussed, surgical consent, monitors and equipment checked, pre-op evaluation, timeout performed and anesthesia consent givenArterial  Skin Prep: chlorhexidine gluconate  Local Infiltration: lidocaine  Orientation: right  Location: radial    Catheter Size: 20 G  Catheter placement by Anatomical landmarks. Heme positive aspiration all ports. Insertion Attempts: 1  Assessment  Dressing: secured with tape and tegaderm  Patient: Tolerated well

## 2023-10-18 NOTE — PROGRESS NOTES
Pt Hx and procedure discussed in detail. Post op orders reviewed. Neuro assessment performed at bedside and no deficits noted. Pt attached to v/s machine and vitals reviewed. Ivs, ART line removal site, and procedure site all assessed and intact. Everyone understands pt info with no further questions.

## 2023-10-18 NOTE — ANESTHESIA PROCEDURE NOTES
Intubation    Date/Time: 10/18/2023 7:18 AM    Performed by: Kiara Soto  Authorized by: Kan Mcarthur MD    Intubation:     Induction:  Intravenous    Intubated:  Postinduction    Mask Ventilation:  Easy mask    Attempts:  1    Attempted By:  Student    Method of Intubation:  Direct    Blade:  Wilson 2    Laryngeal View Grade: Grade I - full view of cords      Difficult Airway Encountered?: No      Complications:  None    Airway Device:  Oral endotracheal tube    Airway Device Size:  7.0    Style/Cuff Inflation:  Cuffed (inflated to minimal occlusive pressure)    Tube secured:  21    Secured at:  The lips    Placement Verified By:  Capnometry    Complicating Factors:  None    Findings Post-Intubation:  BS equal bilateral and atraumatic/condition of teeth unchanged

## 2023-10-18 NOTE — ANESTHESIA PREPROCEDURE EVALUATION
"                                                                                                             10/18/2023  Sheridan Fong is a 73 y.o., female with cad, s/p cab, pvd, and other medical problems listed in the EMR    Cleared by cardiologist as mod risk, ef nl,       Pre-op Assessment    I have reviewed the Patient Summary Reports.     I have reviewed the Nursing Notes. I have reviewed the NPO Status.   I have reviewed the Medications.     Review of Systems      Physical Exam  General: Well nourished and Cooperative    Airway:  Mallampati: II   Mouth Opening: Normal  TM Distance: Normal  Tongue: Normal  Neck ROM: Normal ROM    Chest/Lungs:  Clear to auscultation        Anesthesia Plan  Type of Anesthesia, risks & benefits discussed:    Anesthesia Type: Gen ETT  Intra-op Monitoring Plan: Standard ASA Monitors and Art Line  Post Op Pain Control Plan: multimodal analgesia  Induction:  IV  Informed Consent: Informed consent signed with the Patient and all parties understand the risks and agree with anesthesia plan.  All questions answered.   ASA Score: 3  Day of Surgery Review of History & Physical: H&P Update referred to the surgeon/provider.    Ready For Surgery From Anesthesia Perspective.     .  I explained anesthesia plan to patient/responsbile party if available.  Anesthesia consent done going over the material facts, risks, complications & alternatives, obtained which includes the possibility of altering the anesthesia plan.  I reviewed problem list, prior to admission medication list, appropriate labs, any workup, Xray, EKG etc noted below.  Patients condition is satisfactory to proceed with anesthesia plan unless otherwise noted (see anesthesia chart for details of the anesthesia plan carried out).      Pre-operative evaluation for Procedure(s) (LRB):  ENDARTERECTOMY-CAROTID (Right)    BP (!) 195/77 (BP Location: Left arm)   Pulse 65   Temp 36.8 °C (98.3 °F) (Oral)   Resp 17   Ht 5' 3" (1.6 m) "   Wt 63.7 kg (140 lb 6.9 oz)   LMP  (LMP Unknown)   SpO2 96%   Breastfeeding No   BMI 24.88 kg/m²     Patient Active Problem List   Diagnosis    Bilateral carotid artery stenosis       Review of patient's allergies indicates:   Allergen Reactions    Gemfibrozil Other (See Comments)    Iodine        Current Outpatient Medications   Medication Instructions    albuterol (PROVENTIL/VENTOLIN HFA) 90 mcg/actuation inhaler 1 puff as needed Inhalation every 4 hrs for 30 days    aspirin (ECOTRIN) 81 mg, Oral, Daily    clopidogreL (PLAVIX) 75 mg, Oral, Daily    clopidogrel bisulfate (PLAVIX ORAL) No dose, route, or frequency recorded.    cyclobenzaprine (FLEXERIL) 10 MG tablet 1 tablet Orally every 8 hrs as needed for 5 days    fenofibrate (TRICOR) 145 MG tablet 1 tablet with food Orally Once a day for 90 days    HYDROcodone-acetaminophen (NORCO) 7.5-325 mg per tablet 1 tablet, Oral, Every 6 hours PRN    isosorbide dinitrate (ISORDIL) 40 mg, Oral, 3 times daily    isosorbide mononitrate (IMDUR) 30 MG 24 hr tablet 1 tablet, Oral, Daily    nitroGLYCERIN (NITROSTAT) 0.4 MG SL tablet Sublingual    pioglitazone (ACTOS) 15 MG tablet 1 tablet, Oral, Daily    simvastatin (ZOCOR) 20 MG tablet TAKE ONE TABLET BY MOUTH EVERY DAY Orally Once a day for 90 days    vitamin E 100 Units, Oral, Daily       Past Surgical History:   Procedure Laterality Date    BIOPSY  02/14/2019    Vulvar    CATARACT EXTRACTION Bilateral     COLONOSCOPY      CORONARY ANGIOGRAPHY INCLUDING BYPASS GRAFTS WITH CATHETERIZATION OF LEFT HEART Left 04/24/2014    CORONARY ARTERY BYPASS GRAFT  10/01/2000    x4    CORONARY STENT PLACEMENT      x8    ROBOT-ASSISTED SURGICAL REMOVAL OF FALLOPIAN TUBE USING DA DOUG XI      TUBAL LIGATION         Social History     Socioeconomic History    Marital status: Unknown   Tobacco Use    Smoking status: Former     Current packs/day: 0.00     Types: Cigarettes     Quit date: 1998     Years since  "quittin.8    Smokeless tobacco: Never   Substance and Sexual Activity    Alcohol use: Never    Drug use: Never       Lab Results   Component Value Date    WBC 3.31 (L) 10/11/2023    HGB 11.6 (L) 10/11/2023    HCT 38.0 10/11/2023    MCV 95.2 (H) 10/11/2023     10/11/2023          BMP  Lab Results   Component Value Date    HCT 38.0 10/11/2023     10/11/2023    K 4.3 10/11/2023    BUN 28.4 (H) 10/11/2023    CREATININE 1.03 (H) 10/11/2023    CALCIUM 9.6 10/11/2023        INR  No results for input(s): "PT", "INR", "PROTIME", "APTT" in the last 72 hours.        Diagnostic Studies:      EKG:  Results for orders placed or performed in visit on 10/11/23   EKG 12-lead    Collection Time: 10/11/23  8:51 AM    Narrative    Test Reason : I65.23,    Vent. Rate : 063 BPM     Atrial Rate : 063 BPM     P-R Int : 154 ms          QRS Dur : 078 ms      QT Int : 450 ms       P-R-T Axes : 013 013 066 degrees     QTc Int : 460 ms    Normal sinus rhythm  Nonspecific ST and T wave abnormality  Abnormal ECG  No previous ECGs available  Confirmed by Jerry Cannon MD ([id number]) on 10/11/2023 8:29:10 PM    Referred By:             Confirmed By:Jerry Cannon MD            "

## 2023-10-19 VITALS
HEIGHT: 63 IN | WEIGHT: 140.63 LBS | OXYGEN SATURATION: 93 % | RESPIRATION RATE: 18 BRPM | DIASTOLIC BLOOD PRESSURE: 69 MMHG | SYSTOLIC BLOOD PRESSURE: 152 MMHG | TEMPERATURE: 98 F | BODY MASS INDEX: 24.92 KG/M2 | HEART RATE: 91 BPM

## 2023-10-19 PROBLEM — I65.21 CAROTID ARTERY STENOSIS, ASYMPTOMATIC, RIGHT: Status: ACTIVE | Noted: 2023-09-28

## 2023-10-19 LAB
POCT GLUCOSE: 104 MG/DL (ref 70–110)
POCT GLUCOSE: 132 MG/DL (ref 70–110)

## 2023-10-19 PROCEDURE — 25000003 PHARM REV CODE 250: Performed by: SPECIALIST

## 2023-10-19 RX ORDER — ISOSORBIDE MONONITRATE 30 MG/1
90 TABLET, EXTENDED RELEASE ORAL NIGHTLY
Qty: 30 TABLET | Refills: 11
Start: 2023-10-19 | End: 2024-10-18

## 2023-10-19 RX ORDER — HYDROCODONE BITARTRATE AND ACETAMINOPHEN 7.5; 325 MG/1; MG/1
1 TABLET ORAL EVERY 6 HOURS PRN
Qty: 28 TABLET | Refills: 0 | Status: SHIPPED | OUTPATIENT
Start: 2023-10-19 | End: 2023-11-30

## 2023-10-19 RX ADMIN — HYDROCODONE BITARTRATE AND ACETAMINOPHEN 1 TABLET: 7.5; 325 TABLET ORAL at 12:10

## 2023-10-19 RX ADMIN — ISOSORBIDE DINITRATE 40 MG: 20 TABLET ORAL at 08:10

## 2023-10-19 RX ADMIN — MUPIROCIN: 20 OINTMENT TOPICAL at 08:10

## 2023-10-19 RX ADMIN — ATORVASTATIN CALCIUM 20 MG: 10 TABLET, FILM COATED ORAL at 08:10

## 2023-10-19 RX ADMIN — ASPIRIN 325 MG: 325 TABLET, COATED ORAL at 08:10

## 2023-10-19 NOTE — PLAN OF CARE
Problem: Adult Inpatient Plan of Care  Goal: Plan of Care Review  Outcome: Met  Goal: Patient-Specific Goal (Individualized)  Outcome: Met  Goal: Absence of Hospital-Acquired Illness or Injury  Outcome: Met  Goal: Optimal Comfort and Wellbeing  Outcome: Met  Goal: Readiness for Transition of Care  Outcome: Met     Problem: Fall Injury Risk  Goal: Absence of Fall and Fall-Related Injury  Outcome: Met     Problem: Pain Acute  Goal: Acceptable Pain Control and Functional Ability  Outcome: Met     Problem: Bleeding (Carotid Revascularization)  Goal: Absence of Bleeding  Outcome: Met     Problem: Bowel Motility Impaired (Carotid Revascularization)  Goal: Effective Bowel Elimination  Outcome: Met     Problem: Hemodynamic Instability (Carotid Revascularization)  Goal: Adequate Tissue Perfusion  Outcome: Met     Problem: Infection (Carotid Revascularization)  Goal: Absence of Infection Signs and Symptoms  Outcome: Met     Problem: Neurologic Deterioration (Carotid Revascularization)  Goal: Absence of Acute Neurologic Symptoms  Outcome: Met     Problem: Ongoing Anesthesia Effects (Carotid Revascularization)  Goal: Anesthesia/Sedation Recovery  Outcome: Met     Problem: Pain (Carotid Revascularization)  Goal: Acceptable Pain Control  Outcome: Met     Problem: Postoperative Nausea and Vomiting (Carotid Revascularization)  Goal: Nausea and Vomiting Relief  Outcome: Met     Problem: Postoperative Urinary Retention (Carotid Revascularization)  Goal: Effective Urinary Elimination  Outcome: Met     Problem: Respiratory Compromise (Carotid Revascularization)  Goal: Effective Oxygenation and Ventilation  Outcome: Met

## 2023-10-19 NOTE — DISCHARGE SUMMARY
[facility] [address]  [facility]  Short Stay    Procedure(s) (LRB):  ENDARTERECTOMY-CAROTID (Right)      OUTCOME: Patient tolerated treatment/procedure well without complication and is now ready for discharge.  Underwent right carotid endarterectomy and did well postoperatively with no evidence of cranial nerve deficits or any other neurologic deficits.  Discharging home on postoperative day 1.     DISPOSITION: Home or Self Care    FINAL DIAGNOSIS:  Carotid artery stenosis, asymptomatic, right    FOLLOWUP: In clinic    DISCHARGE INSTRUCTIONS:    Discharge Procedure Orders   Diet Adult Regular     No driving until:   Order Comments: For 7 days     No dressing needed   Scheduling Instructions: Ok to shower. Cleans wound area with antibacterial soap and pat dry.  Leave dermabond in place (will fall off when appropriate).     Notify your health care provider if you experience any of the following:  severe uncontrolled pain     Notify your health care provider if you experience any of the following:  severe persistent headache     Notify your health care provider if you experience any of the following:  persistent dizziness, light-headedness, or visual disturbances     Notify your health care provider if you experience any of the following:  increased confusion or weakness     Activity as tolerated        TIME SPENT ON DISCHARGE: 5 minutes

## 2023-10-19 NOTE — PROGRESS NOTES
[facility]  Vascular Surgery  Progress Note    Patient Name: Sheridan Fong  MRN: [id number]  Admission Date: 10/18/2023  Primary Care Provider: Jayde Lao MD    Subjective:     Interval History:   If nausea yesterday but resolved today, has eaten breakfast in his doing well some limited out of bed activity;  no issues with swallowing.    Post-Op Info:  Procedure(s) (LRB):  ENDARTERECTOMY-CAROTID (Right)   1 Day Post-Op      Medications:  Continuous Infusions:  Scheduled Meds:   aspirin  325 mg Oral Daily    atorvastatin  20 mg Oral Daily    isosorbide dinitrate  40 mg Oral TID    mupirocin   Nasal BID     PRN Meds:albuterol, dextrose 10%, dextrose 10%, glucagon (human recombinant), glucose, glucose, hydrALAZINE, HYDROcodone-acetaminophen, HYDROmorphone, insulin aspart U-100, nitroGLYCERIN, ondansetron     Objective:     Vital Signs (Most Recent):  Temp: 98 °F (36.7 °C) (10/19/23 0335)  Pulse: 67 (10/19/23 0335)  Resp: 16 (10/18/23 2222)  BP: 126/64 (10/19/23 0335)  SpO2: 97 % (10/19/23 0335) Vital Signs (24h Range):  Temp:  [97.3 °F (36.3 °C)-98.3 °F (36.8 °C)] 98 °F (36.7 °C)  Pulse:  [59-70] 67  Resp:  [10-24] 16  SpO2:  [96 %-100 %] 97 %  BP: (100-182)/(46-82) 126/64         Physical Exam    Alert and resting in bed  Breathing easily   Right neck incision appropriate   Good hypoglossal and marginal mandibular function   Good vocal quality  Good extremity function        Assessment/Plan:     There are no hospital problems to display for this patient.    POD#1 status post right CEA who is doing well this morning.  We will let her eat breakfast and increase her activity levels.  We will discharge her later this morning as long as she continues to do well.  We will have her follow up in 6 weeks.  She will resume her Plavix tomorrow.    Wilmer Zhong MD  Vascular Surgery  [facility]

## 2023-10-20 LAB — POCT GLUCOSE: 115 MG/DL (ref 70–110)

## 2023-10-23 ENCOUNTER — TELEPHONE (OUTPATIENT)
Dept: VASCULAR SURGERY | Facility: CLINIC | Age: 73
End: 2023-10-23
Payer: MEDICARE

## 2023-10-23 LAB — PSYCHE PATHOLOGY RESULT: NORMAL

## 2023-10-23 NOTE — TELEPHONE ENCOUNTER
Sheridan Fong is s/p right carotid endarterectomy on 10/18/2023. She was called regarding her post-operative status. She denied signs or symptoms of infection to right neck incision. Denies difficulty swallowing, or ability to move tongue or jaw. Denies recent symptoms of stroke or TIA since surgery. Also denies persistent headaches. Her follow up information was provided (11/30/23 at 8:00am). She was advised to call with any changes or concerns. She verbalized understanding.

## 2023-11-13 RX ORDER — ERGOCALCIFEROL 1.25 MG/1
50000 CAPSULE ORAL
COMMUNITY
Start: 2023-10-06

## 2023-11-22 NOTE — PROGRESS NOTES
"    [facility] Vascular - Clinic Note  Wilmer Zhong MD      Patient Name: Sheridan Fong                   : 1950      MRN: [id number]   Visit Date: 2023       History Present Illness     Reason for Visit: Carotid Artery Disease and Post-operative Follow up     Ms. Fong presents to the clinic for 6 week follow up s/p right carotid endarterectomy 10/18/23. Carotid duplex obtained today. She denies fevers, drainage from the incision, worsening pain or swelling.  She denies signs or symptoms of stroke or TIA. Denies headaches or difficulty swallowing.       REVIEW OF SYSTEMS:  ROS  12 point review of systems conducted, negative except as stated in the history of present illness. See HPI for details.        Physical Exam      Vitals:    23 0822 23 0824   BP: (!) 168/76 (!) 163/71   BP Location: Right arm Left arm   Pulse: 65 65   Weight: 63.5 kg (140 lb)    Height: 5' 3" (1.6 m)           General: cranial nerves are grossly intact, ambulating normally, alert, pleasant, conversant, and oriented  Neurologic: cranial nerves are grossly intact marginal mandibular of the facial nerve and hypoglossal nerve demonstrates appropriate function, no neurologic deficits, no motor deficits, and no sensory deficits  HEENT: grossly normal and no scleral icterus  Neck/Chest: normal , soft without lymphadenopathy, and right neck incision is present  Respiratory: breathing easily and without respiratory distress  Abdomen: normal and soft  Cardiology: regular rate and rhythm    Upper Extremity Arterial Exam:   Right - radial is palpable  Left - radial is palpable      Musculoskeletal:   Upper Extremity: normal bilateral hand function    Post Operative Incision:   Location: right neck  clean, dry, no drainage, and healing appropriately             Assessment and Plan     Ms. Fong is a 73 y.o. is s/p right carotid endarterectomy on 10/18/2023 for stroke risk reduction.  She has done well during post " operative recovery with a well-healed right neck incision. Carotid duplex obtained today demonstrates RIGHT carotid with normal post operative changes and a peak systolic velocity of 98 cm/s. LEFT carotid demonstrates moderate stenosis with a peak systolic velocity of 159 cm/s. Instructed to continue Plavix (clopidogrel) and statin therapy. Recommend follow up in 6 month follow up with repeat duplex.        1. Bilateral carotid artery stenosis  - CV Ultrasound Bilateral Doppler Carotid; Future          Imaging Obtained/Reviewed   Study: carotid duplex  Date:   11/30/23        Medical History     Past Medical History:   Diagnosis Date    Anemia, unspecified     Arthritis     CAD (coronary artery disease)     Cardiac arrhythmia     Diverticulitis     Heart disease     History of Clostridioides difficile infection     History of stroke     HLD (hyperlipidemia)     HTN (hypertension)     Hypomagnesemia     Left ventricular dysfunction     LVH (left ventricular hypertrophy)     Nonrheumatic mitral (valve) insufficiency     Occlusion and stenosis of bilateral carotid arteries     SOB (shortness of breath) on exertion     Type 2 diabetes mellitus without complications      Past Surgical History:   Procedure Laterality Date    BIOPSY  02/14/2019    Vulvar    CAROTID ENDARTERECTOMY Right 10/18/2023    Procedure: ENDARTERECTOMY-CAROTID;  Surgeon: Wilmer Zhong MD;  Location: [facility];  Service: Peripheral Vascular;  Laterality: Right;    CATARACT EXTRACTION Bilateral     COLONOSCOPY      CORONARY ANGIOGRAPHY INCLUDING BYPASS GRAFTS WITH CATHETERIZATION OF LEFT HEART Left 04/24/2014    CORONARY ARTERY BYPASS GRAFT  10/01/2000    x4    CORONARY STENT PLACEMENT      x8    ROBOT-ASSISTED SURGICAL REMOVAL OF FALLOPIAN TUBE USING DA DOUG XI      TUBAL LIGATION       Family History   Problem Relation Age of Onset    Hyperlipidemia Mother     Heart attack Father     Hypertension Sister     Hypertension Brother     Cancer  Brother         Tumor of colon     Social History     Socioeconomic History    Marital status: Unknown   Tobacco Use    Smoking status: Former     Current packs/day: 0.00     Types: Cigarettes     Quit date:      Years since quittin.9    Smokeless tobacco: Never   Substance and Sexual Activity    Alcohol use: Never    Drug use: Never     Current Outpatient Medications   Medication Instructions    albuterol (PROVENTIL/VENTOLIN HFA) 90 mcg/actuation inhaler 1 puff as needed Inhalation every 4 hrs for 30 days    aspirin (ECOTRIN) 81 mg, Oral, Daily    clopidogreL (PLAVIX) 75 mg, Oral, Daily    ergocalciferol (ERGOCALCIFEROL) 50,000 Units, Oral, Every 7 days    fenofibrate (TRICOR) 145 MG tablet 1 tablet with food Orally Once a day for 90 days    isosorbide mononitrate (IMDUR) 90 mg, Oral, Nightly    nitroGLYCERIN (NITROSTAT) 0.4 MG SL tablet Sublingual    pioglitazone (ACTOS) 15 MG tablet 1 tablet, Oral, Daily    simvastatin (ZOCOR) 20 MG tablet TAKE ONE TABLET BY MOUTH EVERY DAY Orally Once a day for 90 days    vitamin E 100 Units, Oral, Daily     Review of patient's allergies indicates:   Allergen Reactions    Gemfibrozil Other (See Comments)    Iodine        Patient Care Team:  Jayde Lao MD as PCP - General (Internal Medicine)  Markos Ortiz MD as Consulting Physician (Cardiology)        No follow-ups on file. In addition to their scheduled follow up, the patient has also been instructed to follow up on as needed basis.     No future appointments.

## 2023-11-30 ENCOUNTER — OFFICE VISIT (OUTPATIENT)
Dept: VASCULAR SURGERY | Facility: CLINIC | Age: 73
End: 2023-11-30
Attending: SPECIALIST
Payer: MEDICARE

## 2023-11-30 VITALS
DIASTOLIC BLOOD PRESSURE: 71 MMHG | HEIGHT: 63 IN | HEART RATE: 65 BPM | BODY MASS INDEX: 24.8 KG/M2 | SYSTOLIC BLOOD PRESSURE: 163 MMHG | WEIGHT: 140 LBS

## 2023-11-30 DIAGNOSIS — I65.23 BILATERAL CAROTID ARTERY STENOSIS: Primary | ICD-10-CM

## 2023-11-30 PROCEDURE — 99024 PR POST-OP FOLLOW-UP VISIT: ICD-10-PCS | Mod: POP,,, | Performed by: SPECIALIST

## 2023-11-30 PROCEDURE — 99024 POSTOP FOLLOW-UP VISIT: CPT | Mod: POP,,, | Performed by: SPECIALIST

## 2024-05-29 NOTE — PROGRESS NOTES
"    [facility] Vascular - Clinic Note  Wilmer Zhong MD      Patient Name: Sheridan Fong                   : 1950      MRN: [id number]   Visit Date: 2024       History Present Illness     Reason for Visit: Carotid Artery Disease    Ms. Fong presents to the clinic for 6 month surveillance of carotid artery disease. She is s/p right carotid endarterectomy on 10/18/23. Carotid duplex obtained today. She denies signs or symptoms of stroke or TIA. She denies recent hospitalizations or changes to her health.       REVIEW OF SYSTEMS:  12 point review of systems conducted, negative except as stated in the history of present illness. See HPI for details.        Physical Exam      Vitals:    24 0857 24 0858   BP: (!) 150/79 (!) 151/81   BP Location: Left arm Right arm   Pulse: 64 68   Weight:  64.9 kg (143 lb)   Height:  5' 3" (1.6 m)          General: well-nourished, no acute distress, and healthy appearing, ambulating normally, alert, pleasant, conversant, and oriented  Neurologic: cranial nerves are grossly intact, no neurologic deficits, no motor deficits, and no sensory deficits  HEENT: grossly normal and no scleral icterus  Neck/Chest: normal , soft without lymphadenopathy, and left neck incision is present  Respiratory: breathing easily and without respiratory distress  Abdomen: normal and soft  Cardiology: regular rate and rhythm    Upper Extremity Arterial Exam:   Right - radial is palpable  Left - radial is palpable    Musculoskeletal:   Upper Extremity: normal bilateral hand function  Lower Extremity: no edema present to bilateral lower extremities           Assessment and Plan     Ms. Fong is a 73 y.o. with asymptomatic carotid artery stenosis. She is s/p right carotid endarterectomy on 10/18/23 for stroke risk reduction. Carotid duplex obtained today demonstrates RIGHT carotid with mild stenosis with a peak systolic velocity of 119 cm/s. LEFT carotid demonstrates mild " stenosis with a peak systolic velocity of 133 cm/s. Recommend follow up in 6 months with repeat carotid duplex. Continue Plavix (clopidogrel) and statin therapy.        1. Bilateral carotid artery stenosis  - CV Ultrasound Bilateral Doppler Carotid; Future          Imaging Obtained/Reviewed   Study: carotid duplex  Date:   5/30/24        Medical History     Past Medical History:   Diagnosis Date    Anemia, unspecified     Arthritis     CAD (coronary artery disease)     Cardiac arrhythmia     Diverticulitis     Heart disease     History of Clostridioides difficile infection     History of stroke     HLD (hyperlipidemia)     HTN (hypertension)     Hypomagnesemia     Left ventricular dysfunction     LVH (left ventricular hypertrophy)     Nonrheumatic mitral (valve) insufficiency     Occlusion and stenosis of bilateral carotid arteries     SOB (shortness of breath) on exertion     Type 2 diabetes mellitus without complications      Past Surgical History:   Procedure Laterality Date    BIOPSY  02/14/2019    Vulvar    CAROTID ENDARTERECTOMY Right 10/18/2023    Procedure: ENDARTERECTOMY-CAROTID;  Surgeon: Wilmer Zhong MD;  Location: [facility];  Service: Peripheral Vascular;  Laterality: Right;    CATARACT EXTRACTION Bilateral     COLONOSCOPY      CORONARY ANGIOGRAPHY INCLUDING BYPASS GRAFTS WITH CATHETERIZATION OF LEFT HEART Left 04/24/2014    CORONARY ARTERY BYPASS GRAFT  10/01/2000    x4    CORONARY STENT PLACEMENT      x8 - after her CABG    ROBOT-ASSISTED SURGICAL REMOVAL OF FALLOPIAN TUBE USING DA DOUG XI      TUBAL LIGATION       Family History   Problem Relation Name Age of Onset    Hyperlipidemia Mother      Heart attack Father      Hypertension Sister      Hypertension Brother      Cancer Brother          Tumor of colon     Social History     Socioeconomic History    Marital status: Unknown   Tobacco Use    Smoking status: Former     Current packs/day: 0.00     Types: Cigarettes     Quit date: 1998      Years since quittin.4    Smokeless tobacco: Never   Substance and Sexual Activity    Alcohol use: Never    Drug use: Never     Current Outpatient Medications   Medication Instructions    albuterol (PROVENTIL/VENTOLIN HFA) 90 mcg/actuation inhaler 1 puff as needed Inhalation every 4 hrs for 30 days    clopidogreL (PLAVIX) 75 mg, Oral, Daily    ergocalciferol (ERGOCALCIFEROL) 50,000 Units, Oral, Every 7 days    fenofibrate (TRICOR) 145 MG tablet 1 tablet with food Orally Once a day for 90 days    isosorbide mononitrate (IMDUR) 90 mg, Oral, Nightly    pioglitazone (ACTOS) 15 MG tablet 1 tablet, Oral, Daily    simvastatin (ZOCOR) 20 MG tablet TAKE ONE TABLET BY MOUTH EVERY DAY Orally Once a day for 90 days     Review of patient's allergies indicates:   Allergen Reactions    Gemfibrozil Other (See Comments)       Patient Care Team:  Jayde Lao MD as PCP - General (Internal Medicine)  Markos Ortiz MD as Consulting Physician (Cardiology)        No follow-ups on file. In addition to their scheduled follow up, the patient has also been instructed to follow up on as needed basis.     No future appointments.

## 2024-05-30 ENCOUNTER — OFFICE VISIT (OUTPATIENT)
Dept: VASCULAR SURGERY | Facility: CLINIC | Age: 74
End: 2024-05-30
Attending: SPECIALIST
Payer: MEDICARE

## 2024-05-30 VITALS
SYSTOLIC BLOOD PRESSURE: 151 MMHG | BODY MASS INDEX: 25.34 KG/M2 | HEART RATE: 68 BPM | HEIGHT: 63 IN | DIASTOLIC BLOOD PRESSURE: 81 MMHG | WEIGHT: 143 LBS

## 2024-05-30 DIAGNOSIS — I65.23 BILATERAL CAROTID ARTERY STENOSIS: Primary | ICD-10-CM

## 2024-05-30 PROCEDURE — 99213 OFFICE O/P EST LOW 20 MIN: CPT | Mod: ,,, | Performed by: SPECIALIST

## (undated) DEVICE — TUBE SUCTION MEDI-VAC STERILE

## (undated) DEVICE — SUT 7/0 24IN PROLENE BL MO

## (undated) DEVICE — SUT VICRYL 3-0 27 SH

## (undated) DEVICE — SUT MCRYL PLUS 4-0 PS2 27IN

## (undated) DEVICE — SOL NORMAL USPCA 0.9%

## (undated) DEVICE — NDL HEPARIN FLUSHING

## (undated) DEVICE — KIT SURGICAL TURNOVER

## (undated) DEVICE — HEMOSTAT SURGICEL FIBRLR 2X4IN

## (undated) DEVICE — BAG MEDI-PLAST DECANTER C-FLOW

## (undated) DEVICE — CLIP HORIZON LIG TI MED-LG

## (undated) DEVICE — SUT 3-0 12-18IN SILK

## (undated) DEVICE — BOWL STERILE LARGE 32OZ

## (undated) DEVICE — ELECTRODE PATIENT RETURN DISP

## (undated) DEVICE — SUT 4-0 12-18IN SILK BLACK

## (undated) DEVICE — COVER PROBE US 5.5X58L NON LTX

## (undated) DEVICE — Device

## (undated) DEVICE — SUT 2-0 12-18IN SILK

## (undated) DEVICE — SUT PROLENE 6-0 BV-1 30IN

## (undated) DEVICE — WIPE MERCL POLYVIL ACETL 3X3IN

## (undated) DEVICE — ADHESIVE DERMABOND ADVANCED

## (undated) DEVICE — NDL 27G X 1 1/4

## (undated) DEVICE — GAUZE SPONGE XRAY 4X4

## (undated) DEVICE — DRAPE INCISE IOBAN 2 13X13IN

## (undated) DEVICE — GLOVE PROTEXIS HYDROGEL SZ6.5